# Patient Record
(demographics unavailable — no encounter records)

---

## 2019-08-29 NOTE — P.GSHP
History of Present Illness


H&P Date: 19


Chief Complaint: Lump right axilla





Parris is a 50-year-old white female who presents for examination of the right

axilla and breast.  She had bilateral mammograms performed and 2019 which 

revealed dense breast tissue BIRADS 2.  Ultrasound of both breasts performed on 

the same day which again revealed dense heterogeneous echogenic tissue with a 

definite dominant mass or right unusual fluid collection.  The recommendation 

was for repeat bilateral mammogram in one year. 


   The nodularity is tender.  It seems to fluctuate in size. She does not know 

what causes it it change in size.  She has not had any fever or chills.  She 

does not have any other enlarged nodes. She noticed this area because she was 

shaving under her arms.


The patient drinks 1 pop per day.  She additionally smokes at night.  She is 

exposed to secondhand smoke.  She is chocolate occasionally. 





Family History:


none





Hormonal history:


menarche: 13


, breast fed: no, first born at 25


periods regular


BCP: 5 years, tubal-ligation done 23 years ago


hormones:none





Surgical History:


1. tubal-ligation


2. knee arthroscopic





Medial History:


1. rheumatoid arthritis








Social History:


smoke: at night


alcohol: none


drugs: none





 


 











- Constitutional


Constitutional: Denies chills, Denies fever





- EENT


Comment: 





wears glasses


Eyes: denies blurred vision, denies pain


Ears: deny: decreased hearing, tinnitus


Ears, nose, mouth and throat: Denies headache, Denies sore throat





- Breasts


Breasts: bilateral: as per HPI





- Cardiovascular


Cardiovascular: Denies chest pain, Denies shortness of breath





- Respiratory


Respiratory: Denies cough, Denies 7





- Gastrointestinal


Gastrointestinal: Denies abdominal pain, Denies diarrhea, Denies nausea, Denies 

vomiting





- Genitourinary (Female)


Genitourinary: Denies dysuria, Denies hematuria





- Menstruation


Comment: 





rebecca-meopausal


Menstruation: Reports period normal





- Musculoskeletal


Comment: 





arthritis





- Integumentary


Comment: 





inflamed node under right arm





- Neurological


Neurological: Denies numbness, Denies weakness





- Psychiatric


Psychiatric: Denies anxiety, Denies depression





- Endocrine


Endocrine: Reports weight change, Denies fatigue





- Hematologic/Lymphatic


Comment: 





none





- Allergic/Immunologic


Allergic/Immunologic: Reports as per HPI





Past Medical History


History of Any Multi-Drug Resistant Organisms: None Reported


Smoking Status: Current some day smoker





Medications and Allergies


                                Home Medications











 Medication  Instructions  Recorded  Confirmed  Type


 


No Known Home Medications  19 History








                                    Allergies











Allergy/AdvReac Type Severity Reaction Status Date / Time


 


codeine Allergy  Rash/Hives Unverified 19 13:19


 


Penicillins Allergy  Anaphylaxis Unverified 19 13:19














Surgical - Exam


                                   Vital Signs











Temp Pulse Resp BP Pulse Ox


 


 98.4 F   68   18   111/74   96 


 


 19 13:14  19 13:14  19 13:14  19 13:14  19 13:14














BMI 26.2





- General


well developed, well nourished, no distress





- Eyes


normal ocular movement, no icteric





- ENT


no hearing loss, no congestion





- Neck


no masses, trachea midline





- Respiratory


normal respiratory effort, clear to auscultation





- Cardiovascular


Rhythm: regular


Heart Sounds: normal: S1, S2





- Abdomen


Abdomen: soft, non tender, no guarding, no rigid, no rebound





- Integumentary





inflamed area under right arm, no definite abscess, no area to drain at this 

time





- Neurologic


no disoriented, no combative





- Musculoskeletal


normal gait, normal posture





- Psychiatric


oriented to time, oriented to person, oriented to place, speech is normal, 

memory intact





breast exam:


right breast: Multi-positional exam very dense breast with fibrocystic changes 

no discrete dominant mass or nodule of concern


Right axilla: In the hair follicles in the medial portion of the axillary line. 

Some increased tenderness with some palpable slight fullness just beneath the 

hair follicles I believe this is more related to an inflamed hair 

follicle/infection then adenopathy, no discrete adenopathy of concern 

appreciated


Left breast: Multi-positional exam very dense breast with fibrocystic changes no

discrete dominant mass or nodule is of concern


Left axilla: No adenopathy of concern








Results


Mammogram and ultrasound reports from 2019 reviewed





Assessment and Plan


Assessment: 





Impression:


1.  Very dense breast


2.  Fibrocystic breast changes


3.  Right axillary folliculitis/fullness


4.  Rheumatoid arthritis


5.  ALLERGY penicillin and codeine


6. nicotine depndance





Plan:


1.  We will give the patient a trial of Bactrim and see the patient back in 1 

week


2.  Ultrasound of right axilla


3.  Medical management of medical conditions


4. encouraged to stop smoking





We will attempt a trial of Bactrim, if this does not resolve the situation with 

consider clindamycin.  If the area persists and fullness may consider resection 

in the operating room.  At this time we will get a ultrasound of the axilla to 

look for any deep adenopathy although none was palpable.  I have encouraged the 

patient to stop smoking.


C: Michelle

## 2019-09-04 NOTE — USB
Reason for exam: additional evaluation requested from abnormal screening.



Physical Findings:

Nurse Summary: Patient complains of right axilla lump x 5 months with intermittent

pain (nurse mj).



US Breast Workup Limited RT

Right limited breast ultrasound including focal area of concern, retroareolar and 

axilla demonstrates a 0.8 x 0.9 x 0.3cm cystic lesion at 9 o'clock and a 1.0 x 1.1

x 0.6cm cystic lesion at 10 o'clock. No abnormality seen at axilla palpable.



These results were verbally communicated with the patient and result sheet given 

to the patient on 9/3/19.





ASSESSMENT: Benign, BI-RAD 2



RECOMMENDATION:

Routine screening mammogram of both breasts in 10 months.

Back on schedule for June 2020.

Manage patient on a clinical basis.

## 2019-09-06 NOTE — P.PN
Subjective


Progress Note Date: 19


Parris is a 50-year-old white female who presents for examination of the right

axilla and breast.  She had bilateral mammograms performed 2019 which 

revealed dense breast tissue BIRADS 2.  Ultrasound of both breasts performed on 

the same day which again revealed dense heterogeneous echogenic tissue with no 

definite dominant mass or unusual fluid collection.  The recommendation was for 

repeat bilateral mammogram in one year. 


   The nodularity is tender.  It seems to fluctuate in size. She does not know 

what causes it it change in size.  She has not had any fever or chills.  She 

does not have any other enlarged nodes. She noticed this area because she was 

shaving under her arms.


The patient drinks 1 pop per day.  She additionally smokes at night.  She is 

exposed to secondhand smoke.  She is chocolate occasionally. 


She was seen last week and startd on Bactrim.  Ultrasound of the right axilla 

benign BIRAD 2, two cystic lesions in the breast.  The area of concern under the

right breast has resolved with Bactrim.  She has no pain at this site now.  

There is very small area of nodularity.





Family History:


none





Hormonal history:


menarche: 13


, breast fed: no, first born at 25


periods regular


BCP: 5 years, tubal-ligation done 23 years ago


hormones:none





Surgical History:


1. tubal-ligation


2. knee arthroscopic





Medial History:


1. rheumatoid arthritis








Social History:


smoke: at night


alcohol: none


drugs: none





 


 











- Constitutional


Constitutional: Denies chills, Denies fever





- EENT


Comment: 





wears glasses


Eyes: denies blurred vision, denies pain


Ears: deny: decreased hearing, tinnitus


Ears, nose, mouth and throat: Denies headache, Denies sore throat





- Breasts


Breasts: bilateral: as per HPI





- Cardiovascular


Cardiovascular: Denies chest pain, Denies shortness of breath





- Respiratory


Respiratory: Denies cough, Denies 7





- Gastrointestinal


Gastrointestinal: Denies abdominal pain, Denies diarrhea, Denies nausea, Denies 

vomiting





- Genitourinary (Female)


Genitourinary: Denies dysuria, Denies hematuria





- Menstruation


Comment: 





rebecca-meopausal


Menstruation: Reports period normal





- Musculoskeletal


Comment: 





arthritis





- Integumentary


Comment: 





inflamed node under right arm





- Neurological


Neurological: Denies numbness, Denies weakness





- Psychiatric


Psychiatric: Denies anxiety, Denies depression





- Endocrine


Endocrine: Reports weight change, Denies fatigue





- Hematologic/Lymphatic


Comment: 











Objective





- Vital Signs


Vital signs: 


                                   Vital Signs











Temp  98.6 F   19 11:30


 


Pulse  66   19 11:30


 


Resp  16   19 11:30


 


BP  120/70   19 11:30


 


Pulse Ox  99   19 11:30








                                 Intake & Output











 19





 18:59 06:59 18:59


 


Weight   63.503 kg














- Exam





BMI 24.8





- Constitutional


General appearance: Present: average body habitus





- EENT


Eyes: Present: EOMI


ENT: Present: hearing grossly normal





- Respiratory


Respiratory: bilateral: CTA





- Cardiovascular


Rhythm: regular


Heart sounds: normal: S1, S2





- Integumentary


Integumentary Comment(s): 





Right axilla: The area of tenderness has resolved and no palpable abnormality of

concern is identified





Assessment and Plan


Assessment: 





Impression:


1.  Ultrasound of right breast/axilla no lesions of concern


2.  Very dense breast


3.  Fibrocystic breast changes


4.  Folliculitis seems to have resolved with antibiotic


Area nicotine dependence


6.  Rheumatoid arthritis


7.  ALLERGY: Penicillin and codeine





Plan:


1.  The area of concern in the right axilla has resolved


2.  Medical management of medical conditions


3.  Continue to encourage to stop smoking


4.  Patient due for bilateral mammogram in 10 months with physician exam at that

time


CC: DR. Loving

## 2020-07-08 NOTE — MM
Reason for exam: screening  (asymptomatic).

Last mammogram was performed 1 year ago.



Physical Findings:

A clinical breast exam by your physician is recommended on an annual basis and 

results should be correlated with mammographic findings.



MG Screening Mammo w CAD

Bilateral CC and MLO view(s) were taken.  XCCL view(s) were taken of the left 

breast.

Prior study comparison: June 26, 2019, mammogram, performed at [facility].  February 14, 2017, mammogram, performed at [facility].

The breast tissue is extremely dense which could obscure a lesion on mammography.

Finding: There are increased, fine, grouped/clustered calcifications in the upper 

outer quadrant, middle position of the right breast.

New finding since June 26, 2019 and February 14, 2017.





ASSESSMENT: Incomplete: need additional imaging evaluation, BI-RAD 0



RECOMMENDATION:

Special view mammogram of the right breast.



[facility] will attempt to contact patient to return for supplemental 

views.

## 2020-07-10 NOTE — P.PN
Subjective


Progress Note Date: 07/10/20


Principal diagnosis: 





abnormal mammogram right breast two sites


 51-year-old white female who was initially seen in 2019 with a complaint

of some nodularity in the right axillary area.  This has resolved.  The patient 

had a bilateral mammogram performed on 7620.  After the special views of the 

right breast were requested.  Those were performed on [address].  After review of 

these 2 areas of calcification were identified and stereotactic core biopsy of 

these 2 areas was recommended.  The patient does not feel any lumps masses or 

nodules in her breasts.  No nipple discharge.  No recent history of trauma or 

infection in the breast.


Caffeine: One pop per day


Smoke: She smokes in the evening and is exposed to secondhand smoke


Theophylline: Occasional


Hormones: Negative





Family History:


none





Hormonal history:


menarche: 13


, breast fed: no, first born at 25


periods regular


BCP: 5 years, tubal-ligation done 23 years ago


hormones:none





Surgical History:


1. tubal-ligation


2. knee arthroscopic





Medial History:


1. rheumatoid arthritis








Social History:


smoke: at night


alcohol: none


drugs: none





 


 











- Constitutional


Constitutional: Denies chills, Denies fever





- EENT


Comment: 





wears glasses


Eyes: denies blurred vision, denies pain


Ears: deny: decreased hearing, tinnitus


Ears, nose, mouth and throat: Denies headache, Denies sore throat





- Breasts


Breasts: bilateral: as per HPI





- Cardiovascular


Cardiovascular: Denies chest pain, Denies shortness of breath





- Respiratory


Respiratory: Denies cough,





- Gastrointestinal


Gastrointestinal: Denies abdominal pain, Denies diarrhea, Denies nausea, Denies 

vomiting





- Genitourinary (Female)


Genitourinary: Denies dysuria, Denies hematuria





- Menstruation


Comment: 





nlast menstral period two months ago


Menstruation: Reports period normal





- Musculoskeletal


Comment: 





arthritis





- Integumentary


Comment: 





inflamed node under right arm resolved





- Neurological


Neurological: Denies numbness, Denies weakness





- Psychiatric


Psychiatric: Denies anxiety, Denies depression





- Endocrine


Endocrine: Reports weight change, Denies fatigue





- Hematologic/Lymphatic


Comment: 





none





- Allergic/Immunologic


Allergic/Immunologic: Reports as per HPI








Objective





- Vital Signs


Vital signs: 


                                   Vital Signs











Temp  98.0 F   07/10/20 14:05


 


Pulse  64   07/10/20 14:05


 


Resp  18   07/10/20 14:05


 


BP  102/71   07/10/20 14:05


 


Pulse Ox  99   07/10/20 14:05








                                 Intake & Output











 07/09/20 07/10/20 07/10/20





 18:59 06:59 18:59


 


Weight   65.771 kg














- Constitutional


General appearance: Present: average body habitus





- EENT


Eyes: Present: EOMI


ENT: Present: hearing grossly normal





- Neck


Neck: Present: normal ROM





- Respiratory


Respiratory: bilateral: CTA





- Cardiovascular


Rhythm: regular


Heart sounds: normal: S1, S2





- Gastrointestinal


General gastrointestinal: Present: normal bowel sounds, soft





- Integumentary


Integumentary: Present: normal turgor





- Musculoskeletal


Musculoskeletal: Present: gait normal





- Psychiatric


Psychiatric: Present: A&O x's 3, appropriate affect, intact judgment & insight





- Additional findings


Additional findings: 





breast exam:


BRA 36C





inspection: ptosis grade 2/3 bilateral


palpation:


right breast: Multi-positional exam no dominant masses or nodules of concern, 

dense breast


Right axilla: Shoddy adenopathy


Left breast: Multi-positional exam no dominant masses or nodules of concern, 

dense breast


Left axilla: No adenopathy of concern











Assessment and Plan


Assessment: 





Impression:


1.  Fibrocystic breast changes


2.  Right breast 2 areas of microcalcifications of concern


3.  Resolved right axillary nodularity





Plan:


1.  Stereotactic core biopsy 2 areas of concern in the right breast





Risk and benefits of procedure discussed with the patient she understands and 

wishes to proceed.  Risks include but are not limited to bleeding, infection, 

reaction to the anesthetic.  Additionally the possibility that the biopsy would 

be discordant would necessitate possible from biopsy in the operating room.  

Open biopsy in the operating room versus watchful waiting are discussed but not 

recommended.





CC: DR. Michelle Desouza





encounter 20 minutes, > 50% of time in planning and counselling

## 2020-07-13 NOTE — MM
Reason for exam: additional evaluation requested from abnormal screening.

Last mammogram was performed less than 1 month ago.



Physical Findings:

Nurse did not find any significant physical abnormalities on exam.



MG Work Up Mamm w CAD RT

CC with magnification, ML with magnification, and ML view(s) were taken of the 

right breast.

Prior study comparison: July 6, 2020, bilateral MG screening mammo w CAD.  June 26, 2019, mammogram, performed at [facility].

The breast tissue is heterogeneously dense. This may lower the sensitivity of 

mammography.  There are two groups of indeterminate calcifications upper outer 

right breast.



These results were verbally communicated with the patient and result sheet given 

to the patient on 7/10/20.





ASSESSMENT: Suspicious, BI-RAD 4



RECOMMENDATION:

Stereotactic core biopsy of the right breast.

(2 sites)



Called office with mammographic findings and has scheduled an appointment for the 

patient for 7/10/20 at 2:00 with Dr. Spicer.





PRELIMINARY REPORT CALLED AND FAXED TO DR. SPICER ON 7/13/20.

## 2020-07-31 NOTE — P.PCN
Date of Procedure: 07/31/20


Preoperative Diagnosis: 


Mammographic abnormality right breast 2 lesions one anterior and one posterior 

calcifications of concern


Postoperative Diagnosis: 


Same


Procedure(s) Performed: 


Stereotactic core biopsy of 2 areas of concern in the right breast


Anesthesia: local


Surgeon: Lavinia Spicer


Pathology: other (Breast tissue)


Condition: stable


Disposition: same day


Indications for Procedure: 


Microcalcifications of concern at 2 sites in the right breast


Operative Findings: 


Dense breast tissue specimens concordant with lesions


Description of Procedure: 


The patient is a 51-year-old white female who on mammogram was noted to have 2 

groups of indeterminate calcifications in the upper outer area of the right 

breast.  Stereotactic core biopsy was recommended.  It was felt that the 

posterior group should be addressed initially followed by the anterior group.  

Risk and benefits of the procedure were discussed with the patient and she 

wished to proceed.  Risks include but are not limited to bleeding infection 

reaction to the anesthetic, inability to target the lesions are more that the 

lesions are not adequately sampled.  She understood and wished to proceed.





The patient was taken to stereotactic core biopsy room.  She was placed on her 

abdomen on the low rad table.  A  film was obtained.  The lesions of 

concern were identified.  The most posterior lesion was targeted initially.  A 

CC from above approach was utilized.  Both lesions were initially targeted.  The

most posterior lesion was approached first.  The breast was prepped using 

Betadine.  12 mL of 1% lidocaine were used to anesthetize the area of concern.  

A 9-gauge vacuum-assisted core rotating biopsy needle was driven to the correct 

coordinates.  The needle was fired.  A post-fire film was obtained.  The needle 

was felt to be too far to the right and the x coordinate was adjusted.  Multiple

core biopsies were obtained and radiograph of the specimen did not reveal the 

microcalcifications.  The needle was withdrawn and a new apparatus was placed.  

Endoscope film was obtained.  The posterior lesion was retargeted.  The needle 

was driven to the correct location.  Additional core biopsies were obtained.  

The area was lavaged.  Radiograph of the specimen revealed the area of concern 

had been sampled.  A secure charles marker was placed.  Following this the anterior

lesion was targeted.  The skin was prepped using Betadine.  An additional 12 mL 

of 1% lidocaine was used to anesthetize the area of concern.  9-gauge vacuum-

assisted core rotating biopsy needle was driven to the correct coordinates.  A 

prefire film was obtained and the needle appeared to be in the correct location.

 A post-fire film was then obtained after firing the needle.  The needle 

appeared to be slightly to the right of the X location.  A slight adjustment of 

X coordinate was performed.  Multiple core biopsies were obtained.  The area was

lavaged.  Radiograph of the specimen revealed that the lesion of concern had 

been sampled.  To secure charles clips were placed for the anterior lesion.  The 

patient tolerated the procedure in stable condition.  The specimens were sent to

pathology.


Posterior lesion felt to be adequately sampled/radiograph showed 

calcifications/1 secure charles at this site


Anterior lesion: Felt to be adequately sampled/radiograph show calcifications/2 

secure marks at this site





The patient will follow-up with Dr. Hi next week.  She tolerated the 

procedure in stable condition.

## 2020-08-04 NOTE — MM
Stereotactic core biopsy right breast 2 cm.   

 

HISTORY: Microcalcifications.

 

The calcifications in question within the right breast were targeted by the undersigned x2.  The exam
ination was performed by the surgeon.  Specimen radiographs demonstrate numerous calcifications withi
n the specimen submitted.  Post procedural mammogram demonstrates appropriate deployment of radiopaqu
e clip markers.  The patient tolerated the procedure well and left the department in stable condition
.  Pathology results are pending. 

 

IMPRESSION: Successful stereotactic core biopsy right breast at 2 sites with pathology results pendin
renita

## 2020-08-07 NOTE — P.PN
Subjective


Progress Note Date: 08/07/20


Principal diagnosis: 





Lobular carcinoma in situ





Parris is a 51-year-old white female status post attempted to quit biopsy of 2

areas of concern in the right breast on 730 120.  Biopsy of the anterior 

segment revealed lobular carcinoma in situ.  The posterior site was benign. She 

is doing well.  














Objective





- Vital Signs


Vital signs: 


                                   Vital Signs











Temp  98.2 F   08/07/20 11:47


 


Pulse  75   08/07/20 11:47


 


Resp  18   08/07/20 11:47


 


BP  128/78   08/07/20 11:47


 


Pulse Ox  97   08/07/20 11:47








                                 Intake & Output











 08/06/20 08/07/20 08/07/20





 18:59 06:59 18:59


 


Weight   65.771 kg














- Exam





BMI 25.7





- Constitutional


General appearance: Present: average body habitus





- EENT


Eyes: Present: EOMI


ENT: Present: hearing grossly normal





- Neck


Neck: Present: normal ROM





- Respiratory


Respiratory: bilateral: CTA





- Cardiovascular


Rhythm: regular


Heart sounds: normal: S1, S2





- Gastrointestinal


General gastrointestinal: Present: normal bowel sounds, soft





- Integumentary


Integumentary: Present: normal turgor





- Musculoskeletal


Musculoskeletal: Present: gait normal





- Psychiatric


Psychiatric: Present: A&O x's 3, appropriate affect, intact judgment & insight





- Additional findings


Additional findings: 





Right breast biopsy site:





Evidence of infection or hematoma


Mild ecchymosis





Assessment and Plan


Assessment: 





Impression:


1.  Patient status post attempted core biopsy right breast in 2 sites, the 

posterior site is benign fibrocystic disease the anterior site is ALH/LCIS





Plan:


1.  Needle local excisional biopsy of the anterior stereotactic core biopsy 

site.  The procedure will be via needle localization, probable crescent 

mastopexy, onco-plastic tissue transfer





CC: DR. Loving








encounter 25 minutes, > 50% of time in planning and counselling